# Patient Record
Sex: FEMALE | Race: AMERICAN INDIAN OR ALASKA NATIVE
[De-identification: names, ages, dates, MRNs, and addresses within clinical notes are randomized per-mention and may not be internally consistent; named-entity substitution may affect disease eponyms.]

---

## 2018-10-08 ENCOUNTER — HOSPITAL ENCOUNTER (INPATIENT)
Dept: HOSPITAL 5 - ED | Age: 23
LOS: 2 days | Discharge: HOME | DRG: 872 | End: 2018-10-10
Attending: INTERNAL MEDICINE | Admitting: INTERNAL MEDICINE
Payer: SELF-PAY

## 2018-10-08 DIAGNOSIS — N39.0: ICD-10-CM

## 2018-10-08 DIAGNOSIS — Z82.49: ICD-10-CM

## 2018-10-08 DIAGNOSIS — A41.9: Primary | ICD-10-CM

## 2018-10-08 DIAGNOSIS — A64: ICD-10-CM

## 2018-10-08 DIAGNOSIS — F17.210: ICD-10-CM

## 2018-10-08 DIAGNOSIS — Z71.6: ICD-10-CM

## 2018-10-08 DIAGNOSIS — N73.9: ICD-10-CM

## 2018-10-08 DIAGNOSIS — D72.829: ICD-10-CM

## 2018-10-08 LAB
ALBUMIN SERPL-MCNC: 4.7 G/DL (ref 3.9–5)
ALT SERPL-CCNC: 20 UNITS/L (ref 7–56)
BAND NEUTROPHILS # (MANUAL): 0.7 K/MM3
BILIRUB DIRECT SERPL-MCNC: 0.7 MG/DL (ref 0–0.2)
BILIRUB UR QL STRIP: (no result)
BLOOD UR QL VISUAL: (no result)
BUN SERPL-MCNC: 7 MG/DL (ref 7–17)
BUN/CREAT SERPL: 8 %
CALCIUM SERPL-MCNC: 9.8 MG/DL (ref 8.4–10.2)
HCT VFR BLD CALC: 40.2 % (ref 30.3–42.9)
HEMOLYSIS INDEX: 1
HGB BLD-MCNC: 13 GM/DL (ref 10.1–14.3)
INR PPP: 1.15 (ref 0.87–1.13)
LIPASE SERPL-CCNC: 27 UNITS/L (ref 13–60)
MCH RBC QN AUTO: 26 PG (ref 28–32)
MCHC RBC AUTO-ENTMCNC: 33 % (ref 30–34)
MCV RBC AUTO: 80 FL (ref 79–97)
MUCOUS THREADS #/AREA URNS HPF: (no result) /HPF
MYELOCYTES # (MANUAL): 0 K/MM3
PH UR STRIP: 6 [PH] (ref 5–7)
PLATELET # BLD: 349 K/MM3 (ref 140–440)
PROMYELOCYTES # (MANUAL): 0 K/MM3
RBC # BLD AUTO: 5 M/MM3 (ref 3.65–5.03)
RBC #/AREA URNS HPF: 39 /HPF (ref 0–6)
TOTAL CELLS COUNTED BLD: 200
UROBILINOGEN UR-MCNC: 4 MG/DL (ref ?–2)
WBC #/AREA URNS HPF: > 182 /HPF (ref 0–6)

## 2018-10-08 PROCEDURE — 82140 ASSAY OF AMMONIA: CPT

## 2018-10-08 PROCEDURE — 87040 BLOOD CULTURE FOR BACTERIA: CPT

## 2018-10-08 PROCEDURE — 81025 URINE PREGNANCY TEST: CPT

## 2018-10-08 PROCEDURE — 76856 US EXAM PELVIC COMPLETE: CPT

## 2018-10-08 PROCEDURE — 85025 COMPLETE CBC W/AUTO DIFF WBC: CPT

## 2018-10-08 PROCEDURE — 87086 URINE CULTURE/COLONY COUNT: CPT

## 2018-10-08 PROCEDURE — 99406 BEHAV CHNG SMOKING 3-10 MIN: CPT

## 2018-10-08 PROCEDURE — 87806 HIV AG W/HIV1&2 ANTB W/OPTIC: CPT

## 2018-10-08 PROCEDURE — 96365 THER/PROPH/DIAG IV INF INIT: CPT

## 2018-10-08 PROCEDURE — 80074 ACUTE HEPATITIS PANEL: CPT

## 2018-10-08 PROCEDURE — 83690 ASSAY OF LIPASE: CPT

## 2018-10-08 PROCEDURE — 76830 TRANSVAGINAL US NON-OB: CPT

## 2018-10-08 PROCEDURE — 80048 BASIC METABOLIC PNL TOTAL CA: CPT

## 2018-10-08 PROCEDURE — 85007 BL SMEAR W/DIFF WBC COUNT: CPT

## 2018-10-08 PROCEDURE — 96366 THER/PROPH/DIAG IV INF ADDON: CPT

## 2018-10-08 PROCEDURE — 36415 COLL VENOUS BLD VENIPUNCTURE: CPT

## 2018-10-08 PROCEDURE — 87591 N.GONORRHOEAE DNA AMP PROB: CPT

## 2018-10-08 PROCEDURE — 71045 X-RAY EXAM CHEST 1 VIEW: CPT

## 2018-10-08 PROCEDURE — 96375 TX/PRO/DX INJ NEW DRUG ADDON: CPT

## 2018-10-08 PROCEDURE — 74177 CT ABD & PELVIS W/CONTRAST: CPT

## 2018-10-08 PROCEDURE — 85610 PROTHROMBIN TIME: CPT

## 2018-10-08 PROCEDURE — 81001 URINALYSIS AUTO W/SCOPE: CPT

## 2018-10-08 PROCEDURE — 82805 BLOOD GASES W/O2 SATURATION: CPT

## 2018-10-08 NOTE — EMERGENCY DEPARTMENT REPORT
HPI





- General


Chief Complaint: Abdominal Pain


Time Seen by Provider: 10/08/18 20:20





- HPI


HPI: 





23-year-old  female presents to the emergency department from 

home with complaint of some abdominal pain and back pain that has been going on 

since Saturday but worsened today.  The patient also has been having some mild 

vaginal bleeding with clots except she is not on her menstrual cycle as her 

last menstrual cycle was September 22.  Today she started developing some 

nausea and vomiting.  She presents with a low-grade fever but has not felt 

febrile.  She did not take anything for her symptoms.  Presentation.  She does 

not have any past medical history.  No recent travel or sick contacts at home.





ED Past Medical Hx





- Past Medical History


Previous Medical History?: No





- Surgical History


Past Surgical History?: No





- Social History


Smoking Status: Current Every Day Smoker


Substance Use Type: None





ED Review of Systems


ROS: 


Stated complaint: VAGINAL BLEEDING/PELVIC PAIN


Other details as noted in HPI





Comment: All other systems reviewed and negative


Constitutional: fever.  denies: chills


Eyes: denies: eye pain, eye discharge, vision change


ENT: denies: ear pain, throat pain


Respiratory: denies: cough, shortness of breath, wheezing


Cardiovascular: denies: chest pain, palpitations


Gastrointestinal: abdominal pain, nausea, vomiting


Genitourinary: abnormal menses.  denies: dysuria


Musculoskeletal: back pain.  denies: arthralgia


Skin: denies: rash, lesions


Neurological: denies: headache, weakness, paresthesias





Physical Exam





- Physical Exam


Vital Signs: 


 Vital Signs











  10/08/18





  18:49


 


Temperature 100.1 F H


 


Pulse Rate 108 H


 


Respiratory 16





Rate 


 


Blood Pressure 154/82


 


O2 Sat by Pulse 100





Oximetry 











Physical Exam: 





GENERAL: The patient is well-developed well-nourished.


HENT: Normocephalic.  Atraumatic.    Patient has moist mucous membranes.


EYES: Extraocular motions are intact.  Pupils equal reactive to light 

bilaterally.


NECK: Supple.  Trachea is midline.


CHEST/LUNGS: Clear to auscultation.  There is no respiratory distress noted.


HEART/CARDIOVASCULAR: Regular.  There is mild tachycardia.  There is no murmur.


ABDOMEN: Abdomen is soft.  There is generalized abdominal tenderness to 

palpation.  No guarding.  Patient has normal bowel sounds.  There is no 

abdominal distention.


SKIN: Skin is warm and dry.


NEURO: The patient is awake, alert, and oriented.  The patient is cooperative.  

The patient has no focal neurologic deficits.  The patient has normal speech.


MUSCULOSKELETAL: There is no tenderness or deformity.  There is no limitation 

range of motion.  There is no evidence of acute injury.


BACK: No CVA tenderness to palpation.





ED Course


 Vital Signs











  10/08/18





  18:49


 


Temperature 100.1 F H


 


Pulse Rate 108 H


 


Respiratory 16





Rate 


 


Blood Pressure 154/82


 


O2 Sat by Pulse 100





Oximetry 














ED Medical Decision Making





- Lab Data


Result diagrams: 


 10/08/18 19:07





 10/08/18 19:07





- Radiology Data


Radiology results: report reviewed, image reviewed


interpreted by me: 





Chest x-ray does not show any acute process.  There are no pleural effusions, 

obvious pneumonia and there is no pneumothorax.





PROCEDURE: CT ABDOMEN PELVIS W CON 





TECHNIQUE: Computerized axial tomography of the abdomen and 


pelvis was performed after the IV injection of iodinated nonionic 


contrast. 





HISTORY: Abd pain, fever 





COMPARISON: No prior studies are available for comparison. 





FINDINGS: 


Liver, spleen, pancreas and adrenal glands are within normal 


limits. Bilateral kidneys demonstrate uniform enhancement without 


hydronephrosis. Urinary bladder is minimally filled with normal 


outlines. Aorta is of normal caliber. There is no free fluid or 


free air. Gallbladder is unremarkable. Small bowel loops are 


within normal limits. Appendix is normal. Vertebral height is 


normal. 





IMPRESSION: 


No acute intra-abdominal or pelvic pathology. 











Transcribed By: [facility] 


Dictated By: NIMCO WHITNEY 


Electronically Authenticated By: NIMCO WHITNEY 


Signed Date/Time: 10/08/18 9002 








- Medical Decision Making





Patient presents with abdominal pain, fever.  She has a 33,000 white count.  

She has a urinary tract infection.  CT of the abdomen and pelvis did not show 

any pyelonephritis, abscess, or any other acute intra-abdominal or pelvic 

pathology.  Patient was started on Zosyn after having blood and urine culture 

sent.  She was given IV fluid resuscitation, nausea medication and pain 

medication.  Patient will be admitted to the hospital for further evaluation 

and treatment was accepted for admission by the hospitalist, Dr. Romano.





- Differential Diagnosis


sepsis, UTI, pyelonephritis, tubo-ovarian abscess, pneumonia


Critical Care Time: No


Critical care attestation.: 


If time is entered above; I have spent that time in minutes in the direct care 

of this critically ill patient, excluding procedure time.








ED Disposition


Clinical Impression: 


Sepsis


Qualifiers:


 Sepsis type: sepsis due to unspecified organism Qualified Code(s): A41.9 - 

Sepsis, unspecified organism





UTI (urinary tract infection)


Qualifiers:


 Urinary tract infection type: acute cystitis Hematuria presence: without 

hematuria Qualified Code(s): N30.00 - Acute cystitis without hematuria





Leukocytosis


Qualifiers:


 Leukocytosis type: unspecified Qualified Code(s): D72.829 - Elevated white 

blood cell count, unspecified





Abdominal pain


Qualifiers:


 Abdominal location: generalized Qualified Code(s): R10.84 - Generalized 

abdominal pain





Disposition: DC-09 OP ADMIT IP TO THIS HOSP


Is pt being admited?: Yes


Condition: Fair


Time of Disposition: 23:58

## 2018-10-08 NOTE — XRAY REPORT
FINAL REPORT



PROCEDURE:  XR CHEST 1V AP



TECHNIQUE:  Chest radiograph anteroposterior view. CPT 84140







HISTORY:  possible Sepsis 



COMPARISON:  No prior studies are available for comparison.



FINDINGS:  

Heart: Normal.



Mediastinum/Vessels: Normal.



Lungs/Pleural space: Normal.



Bony thorax: No acute osseous abnormality.



Life support devices: None.



IMPRESSION:  

No acute cardiopulmonary abnormality.

## 2018-10-08 NOTE — HISTORY AND PHYSICAL REPORT
History of Present Illness


Date of examination: 10/08/18


History of present illness: 


23-year-old woman with no medical problems comes emergency room with complaints 

of abdominal pain.  Pain is in the mid and lower abdomen which she describes as 

a sharp pain, constant, intensity 7/10, radiating to the back, relieved with IV 

morphine, cannot identify exacerbating factors.  Admits to chills, fever, 

dysuria, no frequency


Review of systems


Constitutional: no  weight loss


Ears, eyes, nose, mouth and throat: no nasal congestion, no nasal discharge, no 

sinus pressure, no vision change, no red eye.


Neck: No neck pain or rigidity.


Cardiovascular: no chest pain, palpitations


Respiratory: no cough, shortness of breath


Gastrointestinal: no abdominal pain hematochezia


Genitourinary : no  frequency , no hematuria


Musculoskeletal: no joint swelling or muscle ache 


Integumentary: no rash, no pruritis


Neurological: no parathesias, no numbness, no focal weakness


Endocrine: no cold or heat intolerance, no polyuria or polydipsia


Hematologic/Lymphatic: no easy bruising, no easy bleeding, no gland swelling


Allergic/Immunologic: no urticaria, no angioedema.





PAST MEDICAL HISTORY: None





PAST SURGICAL HISTORY: None





SOCIAL HISTORY: No alcohol, no drugs, tobacco





FAMILY HISTORY: Hypertension








Medications and Allergies


 Allergies











Allergy/AdvReac Type Severity Reaction Status Date / Time


 


No Known Allergies Allergy   Unverified 10/08/18 18:49














Exam





- Physical Exam


Narrative exam: 


Gen. appearance: Patient lying in bed, no apparent distress


HEENT: Normocephalic, atraumatic, pupils equally round and reactive to light,  

extraocular movement intact, and no sclericterus,. No JVD or thyromegaly or 

nodule,neck supple, no carotid bruit ,mucous membranes moist, no exudate or 

erythema


Heart: S1, S2, regular rate and rhythm


Lungs: Clear bilaterally, breathing comfortable


Abdomen: Positive bowel sounds, non-tender, nondistended, no organomegaly


Extremity:no edema cyanosis, clubbing


Skin:  no rash, dry, warm


Neuro: Oriented 3, cranial nerves II-12 intact, speech is fluent, motor and 

sensory intact














- Constitutional


Vitals: 


 











Temp Pulse Resp BP Pulse Ox


 


 98.6 F   94 H  18   128/70   99 


 


 10/08/18 22:08  10/08/18 22:08  10/08/18 22:14  10/08/18 22:08  10/08/18 22:09














Results





- Labs


CBC & Chem 7: 


 10/08/18 19:07





 10/08/18 19:07


Labs: 


 Abnormal lab results











  10/08/18 10/08/18 10/08/18 Range/Units





  19:00 19:07 19:07 


 


WBC   33.5 H   (4.5-11.0)  K/mm3


 


MCH   26 L   (28-32)  pg


 


Seg Neuts % (Manual)   87.0 H   (40.0-70.0)  %


 


Lymphocytes % (Manual)   4.5 L   (13.4-35.0)  %


 


Seg Neutrophils # Man   29.1 H   (1.8-7.7)  K/mm3


 


Monocytes # (Manual)   2.0 H   (0.0-0.8)  K/mm3


 


PT     (12.2-14.9)  Sec.


 


INR     (0.87-1.13)  


 


VBG pH     (7.320-7.420)  


 


Glucose    143 H  ()  mg/dL


 


Lactic Acid     (0.7-2.0)  mmol/L


 


Total Bilirubin     (0.1-1.2)  mg/dL


 


Direct Bilirubin     (0-0.2)  mg/dL


 


Alkaline Phosphatase     ()  units/L


 


Urine WBC (Auto)  > 182.0 H    (0.0-6.0)  /HPF


 


U Epithel Cells (Auto)  39.0 H    (0-13.0)  /HPF














  10/08/18 10/08/18 10/08/18 Range/Units





  19:59 19:59 19:59 


 


WBC     (4.5-11.0)  K/mm3


 


MCH     (28-32)  pg


 


Seg Neuts % (Manual)     (40.0-70.0)  %


 


Lymphocytes % (Manual)     (13.4-35.0)  %


 


Seg Neutrophils # Man     (1.8-7.7)  K/mm3


 


Monocytes # (Manual)     (0.0-0.8)  K/mm3


 


PT  15.2 H    (12.2-14.9)  Sec.


 


INR  1.15 H    (0.87-1.13)  


 


VBG pH    7.424 H  (7.320-7.420)  


 


Glucose     ()  mg/dL


 


Lactic Acid   2.20 H*   (0.7-2.0)  mmol/L


 


Total Bilirubin     (0.1-1.2)  mg/dL


 


Direct Bilirubin     (0-0.2)  mg/dL


 


Alkaline Phosphatase     ()  units/L


 


Urine WBC (Auto)     (0.0-6.0)  /HPF


 


U Epithel Cells (Auto)     (0-13.0)  /HPF














  10/08/18 Range/Units





  20:32 


 


WBC   (4.5-11.0)  K/mm3


 


MCH   (28-32)  pg


 


Seg Neuts % (Manual)   (40.0-70.0)  %


 


Lymphocytes % (Manual)   (13.4-35.0)  %


 


Seg Neutrophils # Man   (1.8-7.7)  K/mm3


 


Monocytes # (Manual)   (0.0-0.8)  K/mm3


 


PT   (12.2-14.9)  Sec.


 


INR   (0.87-1.13)  


 


VBG pH   (7.320-7.420)  


 


Glucose   ()  mg/dL


 


Lactic Acid   (0.7-2.0)  mmol/L


 


Total Bilirubin  1.30 H  (0.1-1.2)  mg/dL


 


Direct Bilirubin  0.7 H  (0-0.2)  mg/dL


 


Alkaline Phosphatase  158 H  ()  units/L


 


Urine WBC (Auto)   (0.0-6.0)  /HPF


 


U Epithel Cells (Auto)   (0-13.0)  /HPF














- Imaging and Cardiology


Chest x-ray: report reviewed


CT scan - abdomen: report reviewed


CT scan - pelvis: report reviewed





Assessment and Plan


Assessment


Sepsis


Urinary tract infection





Plan


Admit to medicine


Plan IV fluid, IV Rocephin, morphine, follow cultures


DVT prophylaxis

## 2018-10-08 NOTE — CAT SCAN REPORT
FINAL REPORT



PROCEDURE:  CT ABDOMEN PELVIS W CON



TECHNIQUE:  Computerized axial tomography of the abdomen and

pelvis was performed after the IV injection of iodinated nonionic

contrast. 



HISTORY:  Abd pain, fever 



COMPARISON:  No prior studies are available for comparison.



FINDINGS:  

Liver, spleen, pancreas and adrenal glands are within normal

limits. Bilateral kidneys demonstrate uniform enhancement without

hydronephrosis. Urinary bladder is minimally filled with normal

outlines. Aorta is of normal caliber. There is no free fluid or

free air. Gallbladder is unremarkable. Small bowel loops are

within normal limits. Appendix is normal. Vertebral height is

normal. 



IMPRESSION:  

No acute intra-abdominal or pelvic pathology.

## 2018-10-09 LAB
BAND NEUTROPHILS # (MANUAL): 0.4 K/MM3
BUN SERPL-MCNC: 7 MG/DL (ref 7–17)
BUN/CREAT SERPL: 10 %
CALCIUM SERPL-MCNC: 9 MG/DL (ref 8.4–10.2)
HCT VFR BLD CALC: 35.4 % (ref 30.3–42.9)
HEMOLYSIS INDEX: 8
HGB BLD-MCNC: 11.5 GM/DL (ref 10.1–14.3)
MCH RBC QN AUTO: 26 PG (ref 28–32)
MCHC RBC AUTO-ENTMCNC: 33 % (ref 30–34)
MCV RBC AUTO: 81 FL (ref 79–97)
MYELOCYTES # (MANUAL): 0 K/MM3
PLATELET # BLD: 292 K/MM3 (ref 140–440)
PROMYELOCYTES # (MANUAL): 0 K/MM3
RBC # BLD AUTO: 4.37 M/MM3 (ref 3.65–5.03)
TOTAL CELLS COUNTED BLD: 200

## 2018-10-09 RX ADMIN — ENOXAPARIN SODIUM SCH MG: 100 INJECTION SUBCUTANEOUS at 09:26

## 2018-10-09 RX ADMIN — ONDANSETRON PRN MG: 2 INJECTION INTRAMUSCULAR; INTRAVENOUS at 09:26

## 2018-10-09 RX ADMIN — MORPHINE SULFATE PRN MG: 2 INJECTION, SOLUTION INTRAMUSCULAR; INTRAVENOUS at 04:42

## 2018-10-09 RX ADMIN — MORPHINE SULFATE PRN MG: 2 INJECTION, SOLUTION INTRAMUSCULAR; INTRAVENOUS at 17:34

## 2018-10-09 RX ADMIN — SODIUM CHLORIDE SCH MLS/HR: 0.9 INJECTION, SOLUTION INTRAVENOUS at 13:12

## 2018-10-09 RX ADMIN — MORPHINE SULFATE PRN MG: 2 INJECTION, SOLUTION INTRAMUSCULAR; INTRAVENOUS at 01:39

## 2018-10-09 RX ADMIN — Medication SCH ML: at 09:27

## 2018-10-09 RX ADMIN — DOXYCYCLINE HYCLATE SCH MG: 100 CAPSULE ORAL at 21:34

## 2018-10-09 RX ADMIN — SODIUM CHLORIDE SCH MLS/HR: 0.9 INJECTION, SOLUTION INTRAVENOUS at 21:34

## 2018-10-09 RX ADMIN — SODIUM CHLORIDE SCH MLS/HR: 0.9 INJECTION, SOLUTION INTRAVENOUS at 01:40

## 2018-10-09 RX ADMIN — CEFTRIAXONE SODIUM SCH MLS/HR: 1 INJECTION, POWDER, FOR SOLUTION INTRAMUSCULAR; INTRAVENOUS at 05:54

## 2018-10-09 NOTE — ULTRASOUND REPORT
ULTRASOUND PELVIC COMPLETE

ULTRASOUND TRANSVAGINAL



HISTORY: Pelvic pain.



COMPARISON:  CT abdomen pelvis with contrast dated 10/8/18.



TECHNIQUE: Transabdominal and transvaginal ultrasound with color 

doppler interrogation.



FINDINGS:



Uterus: The uterus is anteverted. The uterus measures 6.9 x 2.5 x 4.3 

cm. No uterine mass. Normal cervix.



Endometrium: 4 mm. No focal abnormality.



Right ovary: 3.6 x 2.7 x 2.8 cm. No focal abnormality.



Left ovary: 2.9 x 1.9 x 2.8 cm. A 1.2 cm simple cyst is identified.



No pelvic fluid or mass is identified. Normal color doppler 

interrogation.







IMPRESSION:

1.2 cm left ovarian cyst. Otherwise, unremarkable exam.

## 2018-10-09 NOTE — PROGRESS NOTE
Assessment and Plan


Assessment and plan: 





23-year-old woman with no medical problems comes emergency room with complaints 

of abdominal pain.  Pain is in the mid and lower abdomen which she describes as 

a sharp pain, constant, intensity 7/10, radiating to the back





Sepsis, UTI


cheo abx, fup urine cx





Abdominal pain,with elevated direct Bili and Alk Phos


-CT a/p unremarkable


-highly suspect PID


-will check Gonorhea and chlamydia testing given her young age


-empirical rx with rocephin and doxycycline


-case dw GI, Dr Ochoa


-obtain HIV test











History


Interval history: 


denies jaundice





Review of systems


Constitutional: Had low-grade fever overnight, complaining of malaise, no joint 

pains





CVS: No chest pain, no orthopnea, no dyspnea on exertion, no pedal edema





GI: Still complaining of lower abdominal  and pelvic pain, no diarrhea, no 

vomiting, no constipation





Respiratory: No shortness of breath, no wheezing, no coughing





Hospitalist Physical





- Constitutional


Vitals: 


 











Temp Pulse Resp BP Pulse Ox


 


 98.0 F   88   24   116/51   99 


 


 10/09/18 05:07  10/09/18 05:07  10/09/18 05:07  10/09/18 05:07  10/09/18 05:07











General appearance: Present: no acute distress





- EENT


Eyes: Present: PERRL


ENT: hearing intact





- Neck


Neck: Present: supple





- Respiratory


Respiratory effort: normal


Respiratory: bilateral: CTA





- Cardiovascular


Rhythm: regular


Heart Sounds: Present: S1 & S2





- Extremities


Extremities: no ischemia


Peripheral Pulses: within normal limits





- Abdominal


General gastrointestinal: soft, tender (in lower abdomen, and pelvis)





- Integumentary


Integumentary: Present: clear, warm





- Psychiatric


Psychiatric: appropriate mood/affect, intact judgment & insight





- Neurologic


Neurologic: CNII-XII intact, moves all extremities





Results





- Labs


CBC & Chem 7: 


 10/09/18 04:56





 10/09/18 04:56


Labs: 


 Laboratory Last Values











WBC  24.8 K/mm3 (4.5-11.0)  H  10/09/18  04:56    


 


RBC  4.37 M/mm3 (3.65-5.03)   10/09/18  04:56    


 


Hgb  11.5 gm/dl (10.1-14.3)   10/09/18  04:56    


 


Hct  35.4 % (30.3-42.9)   10/09/18  04:56    


 


MCV  81 fl (79-97)   10/09/18  04:56    


 


MCH  26 pg (28-32)  L  10/09/18  04:56    


 


MCHC  33 % (30-34)   10/09/18  04:56    


 


RDW  15.1 % (13.2-15.2)   10/09/18  04:56    


 


Plt Count  292 K/mm3 (140-440)   10/09/18  04:56    


 


Add Manual Diff  Complete   10/09/18  04:56    


 


Total Counted  200   10/09/18  04:56    


 


Seg Neutrophils %  Np   10/08/18  19:07    


 


Seg Neuts % (Manual)  78.5 % (40.0-70.0)  H  10/09/18  04:56    


 


Band Neutrophils %  1.5 %  10/09/18  04:56    


 


Lymphocytes % (Manual)  16.5 % (13.4-35.0)   10/09/18  04:56    


 


Reactive Lymphs % (Man)  0.5 %  10/09/18  04:56    


 


Monocytes % (Manual)  3.0 % (0.0-7.3)   10/09/18  04:56    


 


Eosinophils % (Manual)  0 % (0.0-4.3)   10/09/18  04:56    


 


Basophils % (Manual)  0 % (0.0-1.8)   10/09/18  04:56    


 


Metamyelocytes %  0 %  10/09/18  04:56    


 


Myelocytes %  0 %  10/09/18  04:56    


 


Promyelocytes %  0 %  10/09/18  04:56    


 


Blast Cells %  0 %  10/09/18  04:56    


 


Nucleated RBC %  Not Reportable   10/09/18  04:56    


 


Seg Neutrophils # Man  19.5 K/mm3 (1.8-7.7)  H  10/09/18  04:56    


 


Band Neutrophils #  0.4 K/mm3  10/09/18  04:56    


 


Lymphocytes # (Manual)  4.1 K/mm3 (1.2-5.4)   10/09/18  04:56    


 


Abs React Lymphs (Man)  0.1 K/mm3  10/09/18  04:56    


 


Monocytes # (Manual)  0.7 K/mm3 (0.0-0.8)   10/09/18  04:56    


 


Eosinophils # (Manual)  0.0 K/mm3 (0.0-0.4)   10/09/18  04:56    


 


Basophils # (Manual)  0.0 K/mm3 (0.0-0.1)   10/09/18  04:56    


 


Metamyelocytes #  0.0 K/mm3  10/09/18  04:56    


 


Myelocytes #  0.0 K/mm3  10/09/18  04:56    


 


Promyelocytes #  0.0 K/mm3  10/09/18  04:56    


 


Blast Cells #  0.0 K/mm3  10/09/18  04:56    


 


WBC Morphology  Not Reportable   10/09/18  04:56    


 


Hypersegmented Neuts  Not Reportable   10/09/18  04:56    


 


Hyposegmented Neuts  Not Reportable   10/09/18  04:56    


 


Hypogranular Neuts  Not Reportable   10/09/18  04:56    


 


Smudge Cells  Not Reportable   10/09/18  04:56    


 


Toxic Granulation  Not Reportable   10/09/18  04:56    


 


Toxic Vacuolation  Not Reportable   10/09/18  04:56    


 


Dohle Bodies  Not Reportable   10/09/18  04:56    


 


Pelger-Huet Anomaly  Not Reportable   10/09/18  04:56    


 


Mckinley Rods  Not Reportable   10/09/18  04:56    


 


Platelet Estimate  Consistent w auto   10/09/18  04:56    


 


Clumped Platelets  Not Reportable   10/09/18  04:56    


 


Plt Clumps, EDTA  Not Reportable   10/09/18  04:56    


 


Large Platelets  Not Reportable   10/09/18  04:56    


 


Giant Platelets  Not Reportable   10/09/18  04:56    


 


Platelet Satelliting  Not Reportable   10/09/18  04:56    


 


Plt Morphology Comment  Not Reportable   10/09/18  04:56    


 


RBC Morphology  Not Reportable   10/09/18  04:56    


 


Dimorphic RBCs  Not Reportable   10/09/18  04:56    


 


Polychromasia  Not Reportable   10/09/18  04:56    


 


Hypochromasia  Not Reportable   10/09/18  04:56    


 


Poikilocytosis  Not Reportable   10/09/18  04:56    


 


Anisocytosis  Not Reportable   10/09/18  04:56    


 


Microcytosis  Not Reportable   10/09/18  04:56    


 


Macrocytosis  Not Reportable   10/09/18  04:56    


 


Spherocytes  Not Reportable   10/09/18  04:56    


 


Pappenheimer Bodies  Not Reportable   10/09/18  04:56    


 


Sickle Cells  Not Reportable   10/09/18  04:56    


 


Target Cells  Not Reportable   10/09/18  04:56    


 


Tear Drop Cells  Not Reportable   10/09/18  04:56    


 


Ovalocytes  Not Reportable   10/09/18  04:56    


 


Helmet Cells  Not Reportable   10/09/18  04:56    


 


[place] Bodies  Not Reportable   10/09/18  04:56    


 


Cabot Rings  Not Reportable   10/09/18  04:56    


 


Cristina Cells  Not Reportable   10/09/18  04:56    


 


Bite Cells  Not Reportable   10/09/18  04:56    


 


Crenated Cell  Not Reportable   10/09/18  04:56    


 


Elliptocytes  Not Reportable   10/09/18  04:56    


 


Acanthocytes (Spur)  Not Reportable   10/09/18  04:56    


 


Rouleaux  Not Reportable   10/09/18  04:56    


 


Hemoglobin C Crystals  Not Reportable   10/09/18  04:56    


 


Schistocytes  Not Reportable   10/09/18  04:56    


 


Malaria parasites  Not Reportable   10/09/18  04:56    


 


Pratik Bodies  Not Reportable   10/09/18  04:56    


 


Hem Pathologist Commnt  No   10/09/18  04:56    


 


PT  15.2 Sec. (12.2-14.9)  H  10/08/18  19:59    


 


INR  1.15  (0.87-1.13)  H  10/08/18  19:59    


 


VBG pH  7.424  (7.320-7.420)  H  10/08/18  19:59    


 


Sodium  140 mmol/L (137-145)   10/09/18  04:56    


 


Potassium  3.6 mmol/L (3.6-5.0)   10/09/18  04:56    


 


Chloride  101.6 mmol/L ()   10/09/18  04:56    


 


Carbon Dioxide  24 mmol/L (22-30)   10/09/18  04:56    


 


Anion Gap  18 mmol/L  10/09/18  04:56    


 


BUN  7 mg/dL (7-17)   10/09/18  04:56    


 


Creatinine  0.7 mg/dL (0.7-1.2)   10/09/18  04:56    


 


Estimated GFR  > 60 ml/min  10/09/18  04:56    


 


BUN/Creatinine Ratio  10 %  10/09/18  04:56    


 


Glucose  86 mg/dL ()   10/09/18  04:56    


 


Lactic Acid  1.40 mmol/L (0.7-2.0)   10/08/18  21:20    


 


Calcium  9.0 mg/dL (8.4-10.2)   10/09/18  04:56    


 


Total Bilirubin  1.30 mg/dL (0.1-1.2)  H  10/08/18  20:32    


 


Direct Bilirubin  0.7 mg/dL (0-0.2)  H  10/08/18  20:32    


 


Indirect Bilirubin  0.6 mg/dL  10/08/18  20:32    


 


AST  16 units/L (5-40)   10/08/18  20:32    


 


ALT  20 units/L (7-56)   10/08/18  20:32    


 


Alkaline Phosphatase  158 units/L ()  H  10/08/18  20:32    


 


Total Protein  8.2 g/dL (6.3-8.2)   10/08/18  20:32    


 


Albumin  4.7 g/dL (3.9-5)   10/08/18  20:32    


 


Albumin/Globulin Ratio  1.3 %  10/08/18  20:32    


 


Lipase  27 units/L (13-60)   10/08/18  20:32    


 


Urine Color  Angi  (Yellow)   10/08/18  19:00    


 


Urine Turbidity  Cloudy  (Clear)   10/08/18  19:00    


 


Urine pH  6.0  (5.0-7.0)   10/08/18  19:00    


 


Ur Specific Gravity  1.027  (1.003-1.030)   10/08/18  19:00    


 


Urine Protein  100 mg/dl mg/dL (Negative)   10/08/18  19:00    


 


Urine Glucose (UA)  Neg mg/dL (Negative)   10/08/18  19:00    


 


Urine Ketones  Neg mg/dL (Negative)   10/08/18  19:00    


 


Urine Blood  Lg  (Negative)   10/08/18  19:00    


 


Urine Nitrite  Neg  (Negative)   10/08/18  19:00    


 


Urine Bilirubin  Neg  (Negative)   10/08/18  19:00    


 


Urine Urobilinogen  4.0 mg/dL (<2.0)   10/08/18  19:00    


 


Ur Leukocyte Esterase  Lg  (Negative)   10/08/18  19:00    


 


Urine WBC (Auto)  > 182.0 /HPF (0.0-6.0)  H  10/08/18  19:00    


 


Urine RBC (Auto)  39.0 /HPF (0.0-6.0)   10/08/18  19:00    


 


U Epithel Cells (Auto)  39.0 /HPF (0-13.0)  H  10/08/18  19:00    


 


Urine Mucus  Few /HPF  10/08/18  19:00    


 


Urine HCG, Qual  Negative  (Negative)   10/08/18  19:00

## 2018-10-10 VITALS — SYSTOLIC BLOOD PRESSURE: 133 MMHG | DIASTOLIC BLOOD PRESSURE: 81 MMHG

## 2018-10-10 RX ADMIN — ENOXAPARIN SODIUM SCH MG: 100 INJECTION SUBCUTANEOUS at 10:03

## 2018-10-10 RX ADMIN — Medication SCH: at 10:03

## 2018-10-10 RX ADMIN — DOXYCYCLINE HYCLATE SCH MG: 100 CAPSULE ORAL at 10:03

## 2018-10-10 RX ADMIN — Medication SCH ML: at 07:30

## 2018-10-10 RX ADMIN — ONDANSETRON PRN MG: 2 INJECTION INTRAMUSCULAR; INTRAVENOUS at 12:07

## 2018-10-10 RX ADMIN — SODIUM CHLORIDE SCH MLS/HR: 0.9 INJECTION, SOLUTION INTRAVENOUS at 07:30

## 2018-10-10 RX ADMIN — CEFTRIAXONE SODIUM SCH MLS/HR: 1 INJECTION, POWDER, FOR SOLUTION INTRAMUSCULAR; INTRAVENOUS at 07:34

## 2018-10-10 NOTE — DISCHARGE SUMMARY
Providers





- Providers


Date of Admission: 


10/08/18 22:53





Attending physician: 


SILVIA SHELBY MD





Primary care physician: 


PRIMARY CARE MD








Hospitalization


Condition: Fair


Hospital course: 





23-year-old woman with no medical problems comes emergency room with complaints 

of abdominal pain.  Pain is in the mid and lower abdomen which she describes as 

a sharp pain, constant, intensity 7/10, radiating to the back.  She went on to 

have CT abdomen and pelvis and pelvic ultrasound which unremarkable.  She had 

very elevated white count and symptoms consistent with PID, she admitted to 

recent unprotected sex.  She was treated with antibiotics empirically for 

treatment of PID.  She was counseled about safer sex practices, HIV screen were 

negative.





Diagnosis


PID- likely sexually transmitted, Chlamydia and Gonorrhea pending


UTI


Disposition: DC-01 TO HOME OR SELFCARE


Time spent for discharge: 33 minutes





Core Measure Documentation





- Palliative Care


Palliative Care/ Comfort Measures: Not Applicable





- Core Measures


Any of the following diagnoses?: none





Exam





- Constitutional


Vitals: 


 











Temp Pulse Resp BP Pulse Ox


 


 98.3 F   95 H  20   123/71   100 


 


 10/10/18 04:40  10/10/18 04:40  10/10/18 06:46  10/10/18 04:40  10/10/18 04:40











General appearance: Present: no acute distress, well-nourished





- EENT


Eyes: Present: PERRL


ENT: hearing intact, clear oral mucosa





- Neck


Neck: Present: supple, normal ROM





- Respiratory


Respiratory effort: normal


Respiratory: bilateral: CTA





- Cardiovascular


Heart Sounds: Present: S1 & S2.  Absent: rub, click





- Extremities


Extremities: pulses symmetrical, No edema


Peripheral Pulses: within normal limits





- Abdominal


General gastrointestinal: Present: soft, non-tender, non-distended, normal 

bowel sounds


Female genitourinary: Present: normal





- Integumentary


Integumentary: Present: clear, warm, dry





- Musculoskeletal


Musculoskeletal: gait normal, strength equal bilaterally





- Psychiatric


Psychiatric: appropriate mood/affect, intact judgment & insight





- Neurologic


Neurologic: CNII-XII intact, moves all extremities





Plan


Follow up with: 


PRIMARY CARE,MD [Primary Care Provider] - 7 Days


Prescriptions: 


Doxycycline [Vibramycin CAP] 100 mg PO BID #26 capsule